# Patient Record
Sex: MALE | Race: WHITE | HISPANIC OR LATINO | URBAN - METROPOLITAN AREA
[De-identification: names, ages, dates, MRNs, and addresses within clinical notes are randomized per-mention and may not be internally consistent; named-entity substitution may affect disease eponyms.]

---

## 2019-01-21 ENCOUNTER — APPOINTMENT (RX ONLY)
Dept: URBAN - METROPOLITAN AREA CLINIC 15 | Facility: CLINIC | Age: 59
Setting detail: DERMATOLOGY
End: 2019-01-21

## 2019-01-21 DIAGNOSIS — L81.0 POSTINFLAMMATORY HYPERPIGMENTATION: ICD-10-CM

## 2019-01-21 DIAGNOSIS — I83.1 VARICOSE VEINS OF LOWER EXTREMITIES WITH INFLAMMATION: ICD-10-CM

## 2019-01-21 PROBLEM — B20 HUMAN IMMUNODEFICIENCY VIRUS [HIV] DISEASE: Status: ACTIVE | Noted: 2019-01-21

## 2019-01-21 PROBLEM — E78.5 HYPERLIPIDEMIA, UNSPECIFIED: Status: ACTIVE | Noted: 2019-01-21

## 2019-01-21 PROBLEM — F41.9 ANXIETY DISORDER, UNSPECIFIED: Status: ACTIVE | Noted: 2019-01-21

## 2019-01-21 PROBLEM — I83.10 VARICOSE VEINS OF UNSPECIFIED LOWER EXTREMITY WITH INFLAMMATION: Status: ACTIVE | Noted: 2019-01-21

## 2019-01-21 PROBLEM — M12.9 ARTHROPATHY, UNSPECIFIED: Status: ACTIVE | Noted: 2019-01-21

## 2019-01-21 PROBLEM — I10 ESSENTIAL (PRIMARY) HYPERTENSION: Status: ACTIVE | Noted: 2019-01-21

## 2019-01-21 PROCEDURE — 99202 OFFICE O/P NEW SF 15 MIN: CPT

## 2019-01-21 PROCEDURE — ? TREATMENT REGIMEN

## 2019-01-21 PROCEDURE — ? COUNSELING

## 2019-01-21 PROCEDURE — ? PRESCRIPTION

## 2019-01-21 RX ORDER — TRIAMCINOLONE ACETONIDE 1 MG/G
CREAM TOPICAL BID
Qty: 1 | Refills: 1 | Status: ERX | COMMUNITY
Start: 2019-01-21

## 2019-01-21 RX ADMIN — TRIAMCINOLONE ACETONIDE: 1 CREAM TOPICAL at 15:11

## 2019-01-21 ASSESSMENT — LOCATION SIMPLE DESCRIPTION DERM
LOCATION SIMPLE: LEFT PRETIBIAL REGION
LOCATION SIMPLE: RIGHT PRETIBIAL REGION

## 2019-01-21 ASSESSMENT — LOCATION ZONE DERM: LOCATION ZONE: LEG

## 2019-01-21 ASSESSMENT — SEVERITY ASSESSMENT: SEVERITY: MILD

## 2019-01-21 ASSESSMENT — LOCATION DETAILED DESCRIPTION DERM
LOCATION DETAILED: RIGHT DISTAL PRETIBIAL REGION
LOCATION DETAILED: LEFT DISTAL PRETIBIAL REGION

## 2019-01-21 NOTE — PROCEDURE: TREATMENT REGIMEN
Otc Regimen: .\\nMoisturizer cream ( baseline or Aquaphor )
Initiate Treatment: .\\nCompression stockings ( patient have ) Wear them  everyday during the day , take off compression stockings at bed time and have feetâs elevated when sleeping . \\n. \\ntriamcinolone acetonide 0.1 % topical cream BID Apply to lowers legs twice daily to rash as needed itching up to 2 weeks/month as needed. \\n.
Detail Level: Zone

## 2019-01-21 NOTE — PROCEDURE: MIPS QUALITY
Detail Level: Detailed
Quality 130: Documentation Of Current Medications In The Medical Record: Current Medications with Name, Dosage, Frequency, or Route not Documented, Reason not Given
Quality 110: Preventive Care And Screening: Influenza Immunization: Influenza immunization was not ordered or administered, reason not given